# Patient Record
Sex: FEMALE | Race: WHITE | NOT HISPANIC OR LATINO | URBAN - METROPOLITAN AREA
[De-identification: names, ages, dates, MRNs, and addresses within clinical notes are randomized per-mention and may not be internally consistent; named-entity substitution may affect disease eponyms.]

---

## 2019-08-02 ENCOUNTER — TRANSCRIBE ORDERS (OUTPATIENT)
Dept: ADMINISTRATIVE | Facility: HOSPITAL | Age: 29
End: 2019-08-02

## 2019-08-02 DIAGNOSIS — Q99.8 CHROMOSOME XQ27.3-Q28 DUPLICATION SYNDROME: Primary | ICD-10-CM

## 2019-08-02 DIAGNOSIS — R51.9 INTRACTABLE EPISODIC HEADACHE, UNSPECIFIED HEADACHE TYPE: ICD-10-CM

## 2019-08-10 ENCOUNTER — HOSPITAL ENCOUNTER (OUTPATIENT)
Dept: MRI IMAGING | Facility: HOSPITAL | Age: 29
Discharge: HOME | End: 2019-08-10
Payer: COMMERCIAL

## 2019-08-10 DIAGNOSIS — Q99.8 CHROMOSOME XQ27.3-Q28 DUPLICATION SYNDROME: ICD-10-CM

## 2019-08-10 DIAGNOSIS — R51.9 INTRACTABLE EPISODIC HEADACHE, UNSPECIFIED HEADACHE TYPE: ICD-10-CM

## 2019-08-10 PROCEDURE — 70544 MR ANGIOGRAPHY HEAD W/O DYE: CPT

## 2019-08-10 PROCEDURE — 70553 MRI BRAIN STEM W/O & W/DYE: CPT

## 2019-08-10 PROCEDURE — A9585 GADOBUTROL INJECTION: HCPCS | Performed by: RADIOLOGY

## 2019-08-10 RX ADMIN — GADOBUTROL 6 ML: 604.72 INJECTION INTRAVENOUS at 08:13

## 2023-06-14 ENCOUNTER — OFFICE VISIT (OUTPATIENT)
Dept: DERMATOLOGY | Facility: CLINIC | Age: 33
End: 2023-06-14
Payer: COMMERCIAL

## 2023-06-14 VITALS — TEMPERATURE: 97.8 F | WEIGHT: 152 LBS | HEIGHT: 62 IN | BODY MASS INDEX: 27.97 KG/M2

## 2023-06-14 DIAGNOSIS — D48.9 NEOPLASM OF UNCERTAIN BEHAVIOR: ICD-10-CM

## 2023-06-14 DIAGNOSIS — L85.8 KERATOSIS PILARIS: ICD-10-CM

## 2023-06-14 DIAGNOSIS — Z12.83 SCREENING FOR SKIN CANCER: Primary | ICD-10-CM

## 2023-06-14 DIAGNOSIS — Z80.8 FAMILY HISTORY OF MELANOMA: ICD-10-CM

## 2023-06-14 PROCEDURE — 11102 TANGNTL BX SKIN SINGLE LES: CPT | Performed by: STUDENT IN AN ORGANIZED HEALTH CARE EDUCATION/TRAINING PROGRAM

## 2023-06-14 PROCEDURE — 88305 TISSUE EXAM BY PATHOLOGIST: CPT | Performed by: STUDENT IN AN ORGANIZED HEALTH CARE EDUCATION/TRAINING PROGRAM

## 2023-06-14 PROCEDURE — 99203 OFFICE O/P NEW LOW 30 MIN: CPT | Performed by: STUDENT IN AN ORGANIZED HEALTH CARE EDUCATION/TRAINING PROGRAM

## 2023-06-14 NOTE — PATIENT INSTRUCTIONS
Physical Exam and Assessment/Plan by Diagnosis:    SKIN EXAM - SCREENING FOR SKIN CANCER    SEBORRHEIC KERATOSES  - Relevant exam: Scattered over the trunk/extremities are waxy brown to black plaques and papules with stuck on appearance  - Exam and clinical history consistent with seborrheic keratoses  - Counseled that these are benign growths that do not require treatment  - Counseled that removal of lesions is considered cosmetic and so would incur a fee should patient elect to move forward  - Patient to hold on treatments for now but will inform us should they desire additional treatments    MELANOCYTIC NEVI  -Relevant exam: Scattered over the trunk/extremities are homogenously pigmented brown macules and papules  ELM performed and without concerning findings  - Exam and clinical history consistent with melanocytic nevi  - Educated on the ABCDE's of melanoma; handout provided  - Counseled to return to clinic prior to scheduled appointment should any of these lesions change or should any new lesions of concern arise  - Counseled on use of sun protection daily  Reviewed latest FDA sunscreen guidelines, including use of broad spectrum (UVA and UVB blocking) sunscreen or sun protective clothing with SPF 30-50 every 2-3 hours and reapplied after exposure to water; use of photoprotective clothing, including a broad brim hat and UPF rated clothing if outdoors for several hours; avoid use of tanning beds as these pose significant risk for melanoma and skin cancer  LENTIGINES  OTHER SKIN CHANGES DUE TO CHRONIC EXPOSURE TO NONIONIZING RADIATION  - Relevant exam: Over sun exposed areas are brown macules  ELM performed and without concerning findings  - Exam and clinical history consistent with lentigines  - Educated that these are indicative of prior sun exposure  - Counseled to return to clinic prior to scheduled appointment should any of these lesions change or should any new lesions of concern arise    - Recommended use of sunscreen as above and below  - Counseled on use of sun protection daily  Reviewed latest FDA sunscreen guidelines, including use of broad spectrum (UVA and UVB blocking) sunscreen or sun protective clothing with SPF 30-50 every 2-3 hours and reapplied after exposure to water; use of photoprotective clothing, including a broad brim hat and UPF rated clothing if outdoors for several hours; avoid use of tanning beds as these pose significant risk for melanoma and skin cancer  CHERRY ANGIOMAS  - Relevant exam: Scattered over the trunk/extremities are red papules  - Exam and clinical history consistent with cherry angiomas  - Educated that these are benign  - Educated that removal is considered aesthetic and would incur a fee  - Patient does not wish to pursue removal at this time but will contact us should this change  KERATOSIS PILARIS    Physical Exam:  Anatomic Location Affected & Morphological Description: bilateral upper arms with 4-8GB saima-follicular pinkish-red slightly scaly papules     Additional History of Present Condition:  Present for years, treatments tried include: None  Assessment and Plan:  - History and physical exam consistent with keratosis pilaris (KP)  - Educated that [address] is a common condition that often presents as tiny bumps on the skin that resemble goosebumps or small pimples  These bumps are most often seen on the upper arms and thighs but can be more extensive    - Educated the [address] is caused by abnormal skin cells lining the upper portion of the hair follicles and is strongly associated with genetics  - Educated that [address] is not dangerous but can be associated with eczema  - Educated that bumps often worsen during cool, dry months and that they can resolve or improve with age, but can flare with puberty and hormonal therapies     Based on a thorough discussion of this condition and the management approach to it (including a comprehensive discussion of the known "risks, side effects and potential benefits of treatment), the patient (family) agrees to implement the following specific plan:  Discussed over the counter creams containaining gentle acids  Including:   Amlactin's line of lotions (lactic acid containing)   Cerave SA (salicylic acid containing) lotion or cream  Skin Fix Renewing cream  KP Duty cream (see photo below)  Choose one of the above to start  Advise gentle skin care as follows: Shower with lukewarm water less than 10 minutes   Use Dove unscented soap to groin and armpits and neck  Pat dry after shower  Do not harshly rub  Immediately moisturize with heavy emollient   BEST - OINTMENTS, such as Vaseline, Aquaphor, Cerave healing ointment      BETTER - CREAMS, such as Cerave, Cetaphil, VaniCREAM, Aveeno, Eucerin  AVOID LOTIONS, too thin, most things in pump  Moisturize at least once daily, more often if you can  Can mix moisturizers with the gentle acid creams above or use on top of the acid creams above  NEOPLASM OF UNCERTAIN BEHAVIOR OF SKIN    Assessment and Plan:  I have discussed with the patient that a sample of skin via a \"skin biopsy” would be potentially helpful to further make a specific diagnosis under the microscope  Based on a thorough discussion of this condition and the management approach to it (including a comprehensive discussion of the known risks, side effects and potential benefits of treatment), the patient (family) agrees to implement the following specific plan:    Procedure:  Skin Biopsy  After a thorough discussion of treatment options and risk/benefits/alternatives (including but not limited to local pain, scarring, dyspigmentation, blistering, possible superinfection, and inability to confirm a diagnosis via histopathology), verbal and written consent were obtained and portion of the rash was biopsied for tissue sample    See below for consent that was obtained from patient and subsequent " "Procedure Note  PROCEDURE TANGENTIAL (SHAVE) BIOPSY NOTE:    After obtaining informed consent  at which time there was a discussion about the purpose of biopsy  and low risks of infection and bleeding  The area was prepped and draped in the usual fashion  Anesthesia was obtained with 1% lidocaine with epinephrine  A shave biopsy to an appropriate sampling depth was obtained by Shave (Dermablade or 15 blade) The resulting wound was covered with surgical ointment and bandaged appropriately  The patient tolerated the procedure well without complications and was without signs of functional compromise  Specimen has been sent for review by Dermatopathology  Standard post-procedure care has been explained and has been included in written form within the patient's copy of Informed Consent  INFORMED CONSENT DISCUSSION AND POST-OPERATIVE INSTRUCTIONS FOR PATIENT    I   RATIONALE FOR PROCEDURE  I understand that a skin biopsy allows the Dermatologist to test a lesion or rash under the microscope to obtain a diagnosis  It usually involves numbing the area with numbing medication and removing a small piece of skin; sometimes the area will be closed with sutures  In this specific procedure, sutures are not usually needed  If any sutures are placed, then they are usually need to be removed in 2 weeks or less  I understand that my Dermatologist recommends that a skin \"shave\" biopsy be performed today  A local anesthetic, similar to the kind that a dentist uses when filling a cavity, will be injected with a very small needle into the skin area to be sampled  The injected skin and tissue underneath \"will go to sleep” and become numb so no pain should be felt afterwards  An instrument shaped like a tiny \"razor blade\" (shave biopsy instrument) will be used to cut a small piece of tissue and skin from the area so that a sample of tissue can be taken and examined more closely under the microscope    A slight " "amount of bleeding will occur, but it will be stopped with direct pressure and a pressure bandage and any other appropriate methods  I understands that a scar will form where the wound was created  Surgical ointment will be applied to help protect the wound  Sutures are not usually needed  II   RISKS AND POTENTIAL COMPLICATIONS   I understand the risks and potential complications of a skin biopsy include but are not limited to the following:  Bleeding  Infection  Pain  Scar/keloid  Skin discoloration  Incomplete Removal  Recurrence  Nerve Damage/Numbness/Loss of Function  Allergic Reaction to Anesthesia  Biopsies are diagnostic procedures and based on findings additional treatment or evaluation may be required  Loss or destruction of specimen resulting in no additional findings    My Dermatologist has explained to me the nature of the condition, the nature of the procedure, and the benefits to be reasonably expected compared with alternative approaches  My Dermatologist has discussed the likelihood of major risks or complications of this procedure including the specific risks listed above, such as bleeding, infection, and scarring/keloid  I understand that a scar is expected after this procedure  I understand that my physician cannot predict if the scar will form a \"keloid,\" which extends beyond the borders of the wound that is created  A keloid is a thick, painful, and bumpy scar  A keloid can be difficult to treat, as it does not always respond well to therapy, which includes injecting cortisone directly into the keloid every few weeks  While this usually reduces the pain and size of the scar, it does not eliminate it  I understand that photographs may be taken before and after the procedure  These will be maintained as part of the medical providers confidential records and may not be made available to me    I further authorize the medical provider to use the photographs for teaching purposes or " "to illustrate scientific papers, books, or lectures if in his/her judgment, medical research, education, or science may benefit from its use  I have had an opportunity to fully inquire about the risks and benefits of this procedure and its alternatives  I have been given ample time and opportunity to ask questions and to seek a second opinion if I wished to do so  I acknowledge that there have specifically been no guarantees as to the cosmetic results from the procedure  I am aware that with any procedure there is always the possibility of an unexpected complication  III  POST-PROCEDURAL CARE (WHAT YOU WILL NEED TO DO \"AFTER THE BIOPSY\" TO OPTIMIZE HEALING)    Keep the area clean and dry  Try NOT to remove the bandage or get it wet for the first 24 hours  Gently clean the area and apply surgical ointment (such as Vaseline petrolatum ointment, which is available \"over the counter\" and not a prescription) to the biopsy site for up to 2 weeks straight  This acts to protect the wound from the outside world  Sutures are not usually placed in this procedure  If any sutures were placed, return for suture removal as instructed (generally 1 week for the face, 2 weeks for the body)  Take Acetaminophen (Tylenol) for discomfort, if no contraindications  Ibuprofen or aspirin could make bleeding worse  Call our office immediately for signs of infection: fever, chills, increased redness, warmth, tenderness, discomfort/pain, or pus or foul smell coming from the wound  WHAT TO DO IF THERE IS ANY BLEEDING? If a small amount of bleeding is noticed, place a clean cloth over the area and apply firm pressure for ten minutes  Check the wound after 10 minutes of direct pressure  If bleeding persists, try one more time for an additional 10 minutes of direct pressure on the area    If the bleeding becomes heavier or does not stop after the second attempt, or if you have any other questions about this " procedure, then please call your [facility]'s Dermatologist by calling [phone] (SKIN)  I hereby acknowledge that I have reviewed and verified the site with my Dermatologist and have requested and authorized my Dermatologist to proceed with the procedure

## 2023-06-14 NOTE — PROGRESS NOTES
"[facility] Note     Patient Name: Mel Bartlett  Encounter Date: 6/14/23    Have you been cared for by a [facility] in the last 3 years and, if so, which description applies to you? NO  I am considered a \"new\" patient and must complete all patient intake questions  I am FEMALE/of child-bearing potential     REVIEW OF SYSTEMS:  Have you recently had or currently have any of the following? · Recent fever or chills? No  · Any non-healing wound? No  · Are you pregnant or planning to become pregnant? No  · Are you currently or planning to be nursing or breast feeding? No   PAST MEDICAL HISTORY:  Have you personally ever had or currently have any of the following? If \"YES,\" then please provide more detail  · Skin cancer (such as Melanoma, Basal Cell Carcinoma, Squamous Cell Carcinoma? No  · Tuberculosis, HIV/AIDS, Hepatitis B or C: No  · Systemic Immunosuppression such as Diabetes, Biologic or Immunotherapy, Chemotherapy, Organ Transplantation, Bone Marrow Transplantation No  · Radiation Treatment No   FAMILY HISTORY:  Any \"first degree relatives\" (parent, brother, sister, or child) with the following? • Skin Cancer, Pancreatic or Other Cancer? YES, Both mom and dad have a history of skin cancer - melanoma   PATIENT EXPERIENCE:    • Do you want the Dermatologist to perform a COMPLETE skin exam today including a clinical examination under the \"bra and underwear\" areas? Yes  • If necessary, do we have your permission to call and leave a detailed message on your Preferred Phone number that includes your specific medical information? Yes      Not on File No current outpatient medications on file            • Whom besides the patient is providing clinical information about today's encounter?   o NO ADDITIONAL HISTORIAN (patient alone provided history)    Physical Exam and Assessment/Plan by Diagnosis:      MELANOCYTIC NEVI  -Relevant exam: Scattered over the trunk/extremities are " homogenously pigmented brown macules and papules  ELM performed and without concerning findings  - Exam and clinical history consistent with melanocytic nevi  - Educated on the ABCDE's of melanoma; handout provided  - Counseled to return to clinic prior to scheduled appointment should any of these lesions change or should any new lesions of concern arise  - Counseled on use of sun protection daily  Reviewed latest FDA sunscreen guidelines, including use of broad spectrum (UVA and UVB blocking) sunscreen or sun protective clothing with SPF 30-50 every 2-3 hours and reapplied after exposure to water; use of photoprotective clothing, including a broad brim hat and UPF rated clothing if outdoors for several hours; avoid use of tanning beds as these pose significant risk for melanoma and skin cancer  LENTIGINES  OTHER SKIN CHANGES DUE TO CHRONIC EXPOSURE TO NONIONIZING RADIATION  - Relevant exam: Over sun exposed areas are brown macules  ELM performed and without concerning findings  - Exam and clinical history consistent with lentigines  - Educated that these are indicative of prior sun exposure  - Counseled to return to clinic prior to scheduled appointment should any of these lesions change or should any new lesions of concern arise   - Recommended use of sunscreen as above and below  - Counseled on use of sun protection daily  Reviewed latest FDA sunscreen guidelines, including use of broad spectrum (UVA and UVB blocking) sunscreen or sun protective clothing with SPF 30-50 every 2-3 hours and reapplied after exposure to water; use of photoprotective clothing, including a broad brim hat and UPF rated clothing if outdoors for several hours; avoid use of tanning beds as these pose significant risk for melanoma and skin cancer      CHERRY ANGIOMAS  - Relevant exam: Scattered over the trunk/extremities are red papules  - Exam and clinical history consistent with cherry angiomas  - Educated that these are benign  - Educated that removal is considered aesthetic and would incur a fee  - Patient does not wish to pursue removal at this time but will contact us should this change  KERATOSIS PILARIS    Physical Exam:  • Anatomic Location Affected & Morphological Description: bilateral upper arms with 6-8AP saima-follicular pinkish-red slightly scaly papules     Additional History of Present Condition:  Present for years, treatments tried include: None  Assessment and Plan:  - History and physical exam consistent with keratosis pilaris (KP)  - Educated that [address] is a common condition that often presents as tiny bumps on the skin that resemble goosebumps or small pimples  These bumps are most often seen on the upper arms and thighs but can be more extensive    - Educated the [address] is caused by abnormal skin cells lining the upper portion of the hair follicles and is strongly associated with genetics  - Educated that [address] is not dangerous but can be associated with eczema  - Educated that bumps often worsen during cool, dry months and that they can resolve or improve with age, but can flare with puberty and hormonal therapies  Based on a thorough discussion of this condition and the management approach to it (including a comprehensive discussion of the known risks, side effects and potential benefits of treatment), the patient (family) agrees to implement the following specific plan:  • Discussed over the counter creams containaining gentle acids  Including:   o Amlactin's line of lotions (lactic acid containing)   o Cerave SA (salicylic acid containing) lotion or cream  o Skin Fix Renewing cream  o KP Duty cream (see photo below)  Choose one of the above to start  Advise gentle skin care as follows: • Shower with lukewarm water less than 10 minutes   • Use Dove unscented soap to groin and armpits and neck  • Pat dry after shower  Do not harshly rub     • Immediately moisturize with heavy emollient "  o BEST - OINTMENTS, such as Vaseline, Aquaphor, Cerave healing ointment      o BETTER - CREAMS, such as Cerave, Cetaphil, VaniCREAM, Aveeno, Eucerin  o AVOID LOTIONS, too thin, most things in pump  • Moisturize at least once daily, more often if you can  Can mix moisturizers with the gentle acid creams above or use on top of the acid creams above  NEOPLASM OF UNCERTAIN BEHAVIOR OF SKIN    Physical Exam:  • (Anatomic Location); (Size and Morphological Description); (Differential Diagnosis):  o Specimen A: Left upper back; 0 8 cm pinkish brown plaque with focus of back pigment; diff dx: rule out atypia    • Pertinent Positives:  • Pertinent Negatives: Additional History of Present Condition:  Present for years, pt unsure of any changes in size or appearance  Patient has a family history of melanoma  Assessment and Plan:  • I have discussed with the patient that a sample of skin via a \"skin biopsy” would be potentially helpful to further make a specific diagnosis under the microscope  • Based on a thorough discussion of this condition and the management approach to it (including a comprehensive discussion of the known risks, side effects and potential benefits of treatment), the patient (family) agrees to implement the following specific plan:    o Procedure:  Skin Biopsy  After a thorough discussion of treatment options and risk/benefits/alternatives (including but not limited to local pain, scarring, dyspigmentation, blistering, possible superinfection, and inability to confirm a diagnosis via histopathology), verbal and written consent were obtained and portion of the rash was biopsied for tissue sample  See below for consent that was obtained from patient and subsequent Procedure Note      PROCEDURE TANGENTIAL (SHAVE) BIOPSY NOTE:    • Performing Physician: Dr Rivera  • Anatomic Location; Clinical Description with size (cm); Pre-Op Diagnosis:      Specimen A: Left upper back; 0 8 cm pinkish brown " "plaque with focus of back pigment; diff dx: rule out atypia  • Post-op diagnosis: Same     • Local anesthesia: 1% xylocaine with epi      • Topical anesthesia: None    • Hemostasis: Aluminum chloride       After obtaining informed consent  at which time there was a discussion about the purpose of biopsy  and low risks of infection and bleeding  The area was prepped and draped in the usual fashion  Anesthesia was obtained with 1% lidocaine with epinephrine  A shave biopsy to an appropriate sampling depth was obtained by Shave (Dermablade or 15 blade) The resulting wound was covered with surgical ointment and bandaged appropriately  The patient tolerated the procedure well without complications and was without signs of functional compromise  Specimen has been sent for review by Dermatopathology  Standard post-procedure care has been explained and has been included in written form within the patient's copy of Informed Consent  INFORMED CONSENT DISCUSSION AND POST-OPERATIVE INSTRUCTIONS FOR PATIENT    I   RATIONALE FOR PROCEDURE  I understand that a skin biopsy allows the Dermatologist to test a lesion or rash under the microscope to obtain a diagnosis  It usually involves numbing the area with numbing medication and removing a small piece of skin; sometimes the area will be closed with sutures  In this specific procedure, sutures are not usually needed  If any sutures are placed, then they are usually need to be removed in 2 weeks or less  I understand that my Dermatologist recommends that a skin \"shave\" biopsy be performed today  A local anesthetic, similar to the kind that a dentist uses when filling a cavity, will be injected with a very small needle into the skin area to be sampled  The injected skin and tissue underneath \"will go to sleep” and become numb so no pain should be felt afterwards    An instrument shaped like a tiny \"razor blade\" (shave biopsy instrument) will be used to cut a " "small piece of tissue and skin from the area so that a sample of tissue can be taken and examined more closely under the microscope  A slight amount of bleeding will occur, but it will be stopped with direct pressure and a pressure bandage and any other appropriate methods  I understands that a scar will form where the wound was created  Surgical ointment will be applied to help protect the wound  Sutures are not usually needed  II   RISKS AND POTENTIAL COMPLICATIONS   I understand the risks and potential complications of a skin biopsy include but are not limited to the following:  • Bleeding  • Infection  • Pain  • Scar/keloid  • Skin discoloration  • Incomplete Removal  • Recurrence  • Nerve Damage/Numbness/Loss of Function  • Allergic Reaction to Anesthesia  • Biopsies are diagnostic procedures and based on findings additional treatment or evaluation may be required  • Loss or destruction of specimen resulting in no additional findings    My Dermatologist has explained to me the nature of the condition, the nature of the procedure, and the benefits to be reasonably expected compared with alternative approaches  My Dermatologist has discussed the likelihood of major risks or complications of this procedure including the specific risks listed above, such as bleeding, infection, and scarring/keloid  I understand that a scar is expected after this procedure  I understand that my physician cannot predict if the scar will form a \"keloid,\" which extends beyond the borders of the wound that is created  A keloid is a thick, painful, and bumpy scar  A keloid can be difficult to treat, as it does not always respond well to therapy, which includes injecting cortisone directly into the keloid every few weeks  While this usually reduces the pain and size of the scar, it does not eliminate it  I understand that photographs may be taken before and after the procedure    These will be maintained as part of the " "medical providers confidential records and may not be made available to me  I further authorize the medical provider to use the photographs for teaching purposes or to illustrate scientific papers, books, or lectures if in his/her judgment, medical research, education, or science may benefit from its use  I have had an opportunity to fully inquire about the risks and benefits of this procedure and its alternatives  I have been given ample time and opportunity to ask questions and to seek a second opinion if I wished to do so  I acknowledge that there have specifically been no guarantees as to the cosmetic results from the procedure  I am aware that with any procedure there is always the possibility of an unexpected complication  III  POST-PROCEDURAL CARE (WHAT YOU WILL NEED TO DO \"AFTER THE BIOPSY\" TO OPTIMIZE HEALING)    • Keep the area clean and dry  Try NOT to remove the bandage or get it wet for the first 24 hours  • Gently clean the area and apply surgical ointment (such as Vaseline petrolatum ointment, which is available \"over the counter\" and not a prescription) to the biopsy site for up to 2 weeks straight  This acts to protect the wound from the outside world  • Sutures are not usually placed in this procedure  If any sutures were placed, return for suture removal as instructed (generally 1 week for the face, 2 weeks for the body)  • Take Acetaminophen (Tylenol) for discomfort, if no contraindications  Ibuprofen or aspirin could make bleeding worse  • Call our office immediately for signs of infection: fever, chills, increased redness, warmth, tenderness, discomfort/pain, or pus or foul smell coming from the wound  WHAT TO DO IF THERE IS ANY BLEEDING? If a small amount of bleeding is noticed, place a clean cloth over the area and apply firm pressure for ten minutes  Check the wound after 10 minutes of direct pressure    If bleeding persists, try one more time for an " additional 10 minutes of direct pressure on the area  If the bleeding becomes heavier or does not stop after the second attempt, or if you have any other questions about this procedure, then please call your [facility] Dermatologist by calling [phone] (SKIN)  I hereby acknowledge that I have reviewed and verified the site with my Dermatologist and have requested and authorized my Dermatologist to proceed with the procedure  FAMILY HISTORY OF MELANOMA    Mother and father with history of melanoma (no LNBx or systemic treatments per patient so most likely MMIS)  No hx of pancreatic, ovarian cancer  Single relative with breast cancer very late in life      Scribe Attestation    I,:  Marivel Beverly am acting as a scribe while in the presence of the attending physician :       I,:  Sara Kennedy MD personally performed the services described in this documentation    as scribed in my presence :

## 2023-06-21 NOTE — RESULT ENCOUNTER NOTE
DERMATOPATHOLOGY RESULT NOTE    Results reviewed by ordering physician  Called patient to personally discuss results  [employer] message sent      Instructions for Clinical Derm Team:   (remember to route Result Note to appropriate staff):    None    Result & Plan by Specimen:    Specimen A: benign  Plan: monitor    Case Report  Surgical Pathology Report                         Case: [id number]                                   Authorizing Provider: Denisse Yee MD Collected:           06/14/2023 1146              Ordering Location:     [facility]      Received:            06/14/2023 [address] [place] [address]                                                                Pathologist:           Macario Aguilera MD                                                           Specimen:    Skin, Other, A: Left upper back                                                          Final Diagnosis  A   Skin, left upper back, shave biopsy:     Dermal melanocytic nevus; extending to the tissue edges